# Patient Record
Sex: MALE | Race: WHITE | NOT HISPANIC OR LATINO
[De-identification: names, ages, dates, MRNs, and addresses within clinical notes are randomized per-mention and may not be internally consistent; named-entity substitution may affect disease eponyms.]

---

## 2022-07-11 PROBLEM — Z00.00 ENCOUNTER FOR PREVENTIVE HEALTH EXAMINATION: Status: ACTIVE | Noted: 2022-07-11

## 2022-07-14 ENCOUNTER — APPOINTMENT (OUTPATIENT)
Dept: ORTHOPEDIC SURGERY | Facility: CLINIC | Age: 87
End: 2022-07-14

## 2022-07-14 VITALS — BODY MASS INDEX: 27.94 KG/M2 | HEIGHT: 67 IN | WEIGHT: 178 LBS

## 2022-07-14 VITALS — WEIGHT: 178 LBS | HEIGHT: 67 IN | BODY MASS INDEX: 27.94 KG/M2

## 2022-07-14 DIAGNOSIS — I10 ESSENTIAL (PRIMARY) HYPERTENSION: ICD-10-CM

## 2022-07-14 DIAGNOSIS — Z78.9 OTHER SPECIFIED HEALTH STATUS: ICD-10-CM

## 2022-07-14 DIAGNOSIS — E78.00 PURE HYPERCHOLESTEROLEMIA, UNSPECIFIED: ICD-10-CM

## 2022-07-14 DIAGNOSIS — N40.0 BENIGN PROSTATIC HYPERPLASIA WITHOUT LOWER URINARY TRACT SYMPMS: ICD-10-CM

## 2022-07-14 DIAGNOSIS — E11.9 TYPE 2 DIABETES MELLITUS W/OUT COMPLICATIONS: ICD-10-CM

## 2022-07-14 PROCEDURE — 73130 X-RAY EXAM OF HAND: CPT | Mod: LT

## 2022-07-14 PROCEDURE — 99204 OFFICE O/P NEW MOD 45 MIN: CPT

## 2022-07-14 RX ORDER — CHOLECALCIFEROL (VITAMIN D3)
CRYSTALS MISCELLANEOUS
Refills: 0 | Status: ACTIVE | COMMUNITY

## 2022-07-14 RX ORDER — ACETAMINOPHEN 325 MG/1
TABLET, FILM COATED ORAL
Refills: 0 | Status: ACTIVE | COMMUNITY

## 2022-07-14 RX ORDER — HYPROMELLOSE 2910 (4000 MPA.S) 25 MG/ML
SOLUTION/ DROPS OPHTHALMIC
Refills: 0 | Status: ACTIVE | COMMUNITY

## 2022-07-14 NOTE — IMAGING
[de-identified] : left hand no swelling or deformity\par +thenar atrophy\par full actve range of motion\par decreased sensation to the median nerve\par intact ulnar and radial sensation\par ain/pin/ulnar motor intact\par +tinels, phalens and durkans, negative spurling sign\par palpable pulses with CR<2s\par full motion to the elbow, shoulder\par

## 2022-07-14 NOTE — HISTORY OF PRESENT ILLNESS
[Gradual] : gradual [9] : 9 [7] : 7 [Dull/Aching] : dull/aching [Localized] : localized [Radiating] : radiating [Shooting] : shooting [Tingling] : tingling [Constant] : constant [Retired] : Work status: retired [de-identified] : 7/14/22:  left hand numbness in median innervated fingers. is s/p R CTR. 24/7 symptoms. for >6mos. \par RHD, retired since 1986 [] : Patient is currently injured and not playing sports: no [FreeTextEntry7] : hand

## 2022-07-14 NOTE — ASSESSMENT
[FreeTextEntry1] : The patient was advised of the diagnosis.  The natural history of the pathology was explained in full to the patient in layman's terms. We discussed the nature of the nerve as an electrical cable and what happens to the nerve in carpal tunnel syndrome.  We discussed that treatment for night symptoms included night bracing.  We discussed the possibility of injection when symptoms were intermittent or in patients who were unwilling to undergo surgery with constant symptoms.  We discussed that injection is a diagnostic and therapeutic aide and what this means.  We discussed the use of nerve testing in cases when diagnosis was in doubt or for confirmation to exclude alternate pathology.  We discussed that if symptoms were 24/7 surgery was recommended to give the nerve the best chance to recover but that once symptoms were constant, the nerve may not recover even with surgery.  We discussed that if left alone the nerve progression could worsen and that treatment was indicated to prevent progression of nerve compression.  The longer the nerve is left, the more likely to cause worsening irreversible damage.  All questions were answered.  The risks and benefits of surgical and non-surgical treatment alternatives were explained in full to the patient.\par \par We discussed the recommendation for carpal tunnel surgery- We reviewed that the goal of surgery is to prevent worsening and give the nerve a chance to recover. If symptoms are constant there is a chance that the nerve is already too badly damaged and no longer has the potential to recover.Risks, benefits, and alternatives of surgery discussed with patient (and family).Risks including but not limited to infection, blood loss, tendon damage, muscle damage, nerve damage, stiffness, pain, no resolution of symptoms, CRPS, loss of function, potential for secondary surgery, loss of limb or life.Patient understands and was allowed to voice questions or concerns.They agree to surgery\par

## 2022-08-05 ENCOUNTER — APPOINTMENT (OUTPATIENT)
Age: 87
End: 2022-08-05

## 2022-08-05 PROCEDURE — 64721 CARPAL TUNNEL SURGERY: CPT | Mod: LT

## 2022-08-05 PROCEDURE — ZZZZZ: CPT

## 2022-08-05 RX ORDER — ACETAMINOPHEN AND CODEINE 300; 30 MG/1; MG/1
300-30 TABLET ORAL 4 TIMES DAILY
Qty: 12 | Refills: 0 | Status: ACTIVE | COMMUNITY
Start: 2022-08-05 | End: 1900-01-01

## 2022-08-15 ENCOUNTER — APPOINTMENT (OUTPATIENT)
Dept: ORTHOPEDIC SURGERY | Facility: CLINIC | Age: 87
End: 2022-08-15

## 2022-08-15 PROCEDURE — 99024 POSTOP FOLLOW-UP VISIT: CPT

## 2022-08-15 NOTE — IMAGING
[de-identified] : wound clean dry and intact\par no erythema\par no drainage\par FAROM\par NV improved\par sutures removed\par

## 2022-08-15 NOTE — REASON FOR VISIT
[FreeTextEntry2] : Patient is here today for new patient visit left hand.\par Follow Up: left wrist; patient states his wrist is feeling better

## 2022-08-15 NOTE — HISTORY OF PRESENT ILLNESS
[Gradual] : gradual [5] : 5 [1] : 2 [Dull/Aching] : dull/aching [Localized] : localized [Radiating] : radiating [Shooting] : shooting [Tingling] : tingling [Constant] : constant [Retired] : Work status: retired [de-identified] : DOS- 8/5/22: LEFT CARPAL TUNNEL RELEASE\par \par 8/15/22:  Pt has improved n/t in left fingers.  He has left hand stiffness.\par \par 7/14/22:  left hand numbness in median innervated fingers. is s/p R CTR. 24/7 symptoms. for >6mos. \par RHD, retired since 1986 [] : Patient is currently injured and not playing sports: no [FreeTextEntry7] : hand

## 2022-10-13 ENCOUNTER — APPOINTMENT (OUTPATIENT)
Dept: ORTHOPEDIC SURGERY | Facility: CLINIC | Age: 87
End: 2022-10-13

## 2022-10-13 VITALS — HEIGHT: 67 IN | BODY MASS INDEX: 27.94 KG/M2 | WEIGHT: 178 LBS

## 2022-10-13 DIAGNOSIS — G56.02 CARPAL TUNNEL SYNDROME, LEFT UPPER LIMB: ICD-10-CM

## 2022-10-13 PROCEDURE — 99024 POSTOP FOLLOW-UP VISIT: CPT

## 2022-10-13 NOTE — HISTORY OF PRESENT ILLNESS
[de-identified] : DOS- 8/5/22: LEFT CARPAL TUNNEL RELEASE\par 10/13/22: mild stiffness otherwise ok\par \par 8/15/22:  Pt has improved n/t in left fingers.  He has left hand stiffness.\par \par 7/14/22:  left hand numbness in median innervated fingers. is s/p R CTR. 24/7 symptoms. for >6mos. \par RHD, retired since 1986 [Gradual] : gradual [5] : 5 [1] : 2 [Dull/Aching] : dull/aching [Localized] : localized [Radiating] : radiating [Shooting] : shooting [Tingling] : tingling [Constant] : constant [] : Patient is currently injured and not playing sports: no [Retired] : Work status: retired [FreeTextEntry7] : hand [de-identified] : 8/5/22 [de-identified] : carpal tunnel

## 2022-10-13 NOTE — IMAGING
[de-identified] : wound clean dry and intact\par no erythema\par no drainage\par FAROM\par NV improved\par mild stiffness\par \par

## 2022-11-16 ENCOUNTER — OFFICE (OUTPATIENT)
Dept: URBAN - METROPOLITAN AREA CLINIC 93 | Facility: CLINIC | Age: 87
Setting detail: OPHTHALMOLOGY
End: 2022-11-16
Payer: MEDICARE

## 2022-11-16 DIAGNOSIS — E11.3293: ICD-10-CM

## 2022-11-16 DIAGNOSIS — E11.3292: ICD-10-CM

## 2022-11-16 DIAGNOSIS — H43.813: ICD-10-CM

## 2022-11-16 DIAGNOSIS — E11.3291: ICD-10-CM

## 2022-11-16 PROCEDURE — 92134 CPTRZ OPH DX IMG PST SGM RTA: CPT | Performed by: OPHTHALMOLOGY

## 2022-11-16 PROCEDURE — 92014 COMPRE OPH EXAM EST PT 1/>: CPT | Performed by: OPHTHALMOLOGY

## 2022-11-16 PROCEDURE — 92201 OPSCPY EXTND RTA DRAW UNI/BI: CPT | Performed by: OPHTHALMOLOGY

## 2022-11-16 ASSESSMENT — KERATOMETRY
OD_K2POWER_DIOPTERS: 42.25
OS_K1POWER_DIOPTERS: 42.75
OS_AXISANGLE_DEGREES: 80
OD_K1POWER_DIOPTERS: 44.25
OD_AXISANGLE_DEGREES: 92
OS_K2POWER_DIOPTERS: 42.00

## 2022-11-16 ASSESSMENT — AXIALLENGTH_DERIVED
OS_AL: 23.7135
OD_AL: 23.8821

## 2022-11-16 ASSESSMENT — VISUAL ACUITY
OD_BCVA: 20/80-1
OS_BCVA: 20/50-1

## 2022-11-16 ASSESSMENT — REFRACTION_AUTOREFRACTION
OD_CYLINDER: -3.00
OS_SPHERE: +1.25
OS_CYLINDER: -1.00
OD_SPHERE: +1.00
OS_AXIS: 82
OD_AXIS: 90

## 2022-11-16 ASSESSMENT — SPHEQUIV_DERIVED
OD_SPHEQUIV: -0.5
OS_SPHEQUIV: 0.75

## 2022-11-16 ASSESSMENT — CONFRONTATIONAL VISUAL FIELD TEST (CVF)
OS_FINDINGS: FULL
OD_FINDINGS: FULL

## 2023-05-16 ENCOUNTER — OFFICE (OUTPATIENT)
Facility: LOCATION | Age: 88
Setting detail: OPHTHALMOLOGY
End: 2023-05-16
Payer: MEDICARE

## 2023-05-16 DIAGNOSIS — E11.3293: ICD-10-CM

## 2023-05-16 DIAGNOSIS — E11.3292: ICD-10-CM

## 2023-05-16 DIAGNOSIS — H43.813: ICD-10-CM

## 2023-05-16 DIAGNOSIS — E11.3291: ICD-10-CM

## 2023-05-16 PROCEDURE — 92134 CPTRZ OPH DX IMG PST SGM RTA: CPT | Performed by: OPHTHALMOLOGY

## 2023-05-16 PROCEDURE — 92012 INTRM OPH EXAM EST PATIENT: CPT | Performed by: OPHTHALMOLOGY

## 2023-05-16 PROCEDURE — 92235 FLUORESCEIN ANGRPH MLTIFRAME: CPT | Performed by: OPHTHALMOLOGY

## 2023-05-16 ASSESSMENT — REFRACTION_AUTOREFRACTION
OD_CYLINDER: -3.00
OS_CYLINDER: -1.00
OS_SPHERE: +1.25
OS_AXIS: 82
OD_SPHERE: +1.00
OD_AXIS: 90

## 2023-05-16 ASSESSMENT — KERATOMETRY
OD_K1POWER_DIOPTERS: 44.25
OS_K1POWER_DIOPTERS: 42.75
OS_AXISANGLE_DEGREES: 80
OD_K2POWER_DIOPTERS: 42.25
OD_AXISANGLE_DEGREES: 92
OS_K2POWER_DIOPTERS: 42.00

## 2023-05-16 ASSESSMENT — SPHEQUIV_DERIVED
OS_SPHEQUIV: 0.75
OD_SPHEQUIV: -0.5

## 2023-05-16 ASSESSMENT — CONFRONTATIONAL VISUAL FIELD TEST (CVF)
OD_FINDINGS: FULL
OS_FINDINGS: FULL

## 2023-05-16 ASSESSMENT — VISUAL ACUITY
OS_BCVA: 20/50-2
OD_BCVA: 20/150

## 2023-05-16 ASSESSMENT — AXIALLENGTH_DERIVED
OD_AL: 23.8821
OS_AL: 23.7135

## 2023-05-17 ENCOUNTER — OFFICE (OUTPATIENT)
Facility: LOCATION | Age: 88
Setting detail: OPHTHALMOLOGY
End: 2023-05-17
Payer: MEDICARE

## 2023-05-17 DIAGNOSIS — H52.7: ICD-10-CM

## 2023-05-17 DIAGNOSIS — H01.002: ICD-10-CM

## 2023-05-17 DIAGNOSIS — H01.004: ICD-10-CM

## 2023-05-17 DIAGNOSIS — H52.4: ICD-10-CM

## 2023-05-17 DIAGNOSIS — H01.001: ICD-10-CM

## 2023-05-17 DIAGNOSIS — H01.005: ICD-10-CM

## 2023-05-17 PROCEDURE — 92015 DETERMINE REFRACTIVE STATE: CPT | Performed by: OPHTHALMOLOGY

## 2023-05-17 PROCEDURE — 99212 OFFICE O/P EST SF 10 MIN: CPT | Performed by: OPHTHALMOLOGY

## 2023-05-17 ASSESSMENT — REFRACTION_MANIFEST
OS_CYLINDER: -1.50
OS_AXIS: 090
OS_SPHERE: +2.25
OD_SPHERE: +1.00
OD_AXIS: 090
OS_VA1: 20/25
OD_ADD: +2.75
OD_VA1: 20/25
OS_ADD: +2.75
OD_CYLINDER: -1.50

## 2023-05-17 ASSESSMENT — TONOMETRY
OD_IOP_MMHG: 15
OS_IOP_MMHG: 17

## 2023-05-17 ASSESSMENT — KERATOMETRY
OS_K2POWER_DIOPTERS: 41.75
OD_K2POWER_DIOPTERS: 42.00
OD_K1POWER_DIOPTERS: 44.25
OD_AXISANGLE_DEGREES: 094
OS_K1POWER_DIOPTERS: 43.00
OS_AXISANGLE_DEGREES: 089

## 2023-05-17 ASSESSMENT — CONFRONTATIONAL VISUAL FIELD TEST (CVF)
OD_FINDINGS: FULL
OS_FINDINGS: FULL

## 2023-05-17 ASSESSMENT — SPHEQUIV_DERIVED
OS_SPHEQUIV: 1.5
OS_SPHEQUIV: 0.5
OD_SPHEQUIV: 0.25
OD_SPHEQUIV: -0.875

## 2023-05-17 ASSESSMENT — REFRACTION_AUTOREFRACTION
OD_SPHERE: +0.75
OS_CYLINDER: -2.50
OS_SPHERE: +1.75
OS_AXIS: 082
OD_AXIS: 088
OD_CYLINDER: -3.25

## 2023-05-17 ASSESSMENT — VISUAL ACUITY
OS_BCVA: 20/50-1
OD_BCVA: 20/50+2

## 2023-05-17 ASSESSMENT — AXIALLENGTH_DERIVED
OS_AL: 23.8124
OD_AL: 24.0805
OD_AL: 23.6323
OS_AL: 23.422

## 2023-05-17 ASSESSMENT — LID EXAM ASSESSMENTS
OS_BLEPHARITIS: 1+
OD_BLEPHARITIS: 1+

## 2023-11-21 ENCOUNTER — OFFICE (OUTPATIENT)
Facility: LOCATION | Age: 88
Setting detail: OPHTHALMOLOGY
End: 2023-11-21
Payer: MEDICARE

## 2023-11-21 DIAGNOSIS — E11.3291: ICD-10-CM

## 2023-11-21 DIAGNOSIS — H43.813: ICD-10-CM

## 2023-11-21 DIAGNOSIS — E11.3292: ICD-10-CM

## 2023-11-21 PROCEDURE — 92014 COMPRE OPH EXAM EST PT 1/>: CPT | Performed by: OPHTHALMOLOGY

## 2023-11-21 PROCEDURE — 92134 CPTRZ OPH DX IMG PST SGM RTA: CPT | Performed by: OPHTHALMOLOGY

## 2023-11-21 ASSESSMENT — REFRACTION_AUTOREFRACTION
OD_AXIS: 088
OD_SPHERE: +0.75
OD_CYLINDER: -3.25
OS_SPHERE: +1.75
OS_CYLINDER: -2.50
OS_AXIS: 082

## 2023-11-21 ASSESSMENT — SPHEQUIV_DERIVED
OD_SPHEQUIV: -0.875
OS_SPHEQUIV: 0.5

## 2023-11-21 ASSESSMENT — CONFRONTATIONAL VISUAL FIELD TEST (CVF)
OD_FINDINGS: FULL
OS_FINDINGS: FULL

## 2023-11-21 ASSESSMENT — LID EXAM ASSESSMENTS
OD_BLEPHARITIS: 1+
OS_BLEPHARITIS: 1+

## 2024-11-19 ENCOUNTER — OFFICE (OUTPATIENT)
Facility: LOCATION | Age: 89
Setting detail: OPHTHALMOLOGY
End: 2024-11-19
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
## 2024-11-19 PROCEDURE — NO SHOW FE NO SHOW FEE: Performed by: OPHTHALMOLOGY
